# Patient Record
(demographics unavailable — no encounter records)

---

## 2025-07-01 NOTE — ACTIVE PROBLEMS
Arterial Line    Performed by: Alonzo Lorenzo MD  Authorized by: Alonzo Lorenzo MD    Patient Location:  OR  Indication*:  Continuous blood pressure monitoring  Laterality*:  Left  Site*:  Radial  Max Sterile Barrier Technique*:  Sterile gloves, Sterile dressing applied and Cap/Mask  Local Anesthetic:  None  Prep*:  Chlorhexidine gluconate (CHG)  Catheter Size*:  20 G  Catheter Type:  Arrow  Ultrasound-Guided*: No    Seldinger Technique: Yes    Line Secured*:  Tape and Transparent dressing  Events: patient tolerated procedure well with no complications         [Hypertension] : no hypertension [Heart Disease] : no heart disease [Autoimmune Disease] : no autoimmune disease [Renal Disease] : no kidney disease, no UTI [Neurologic Disorder] : no neurologic disorder, no epilepsy [Psychiatric Disorders] : no psychiatric disorders [Depression] : no depression, no post partum depression [Hepatic Disorder] : no hepatitis, no liver disease [Thrombophlebitis] : no varicosities, no phlebitis [Thyroid Disorder] : no thyroid dysfunction [Trauma] : no trauma/violence [Blood Transfusion (___ Ml)] : no history of blood transfusion

## 2025-07-01 NOTE — FAMILY HISTORY
[Reported Family History Of Birth Defects] : no congenital heart defects [Jesús-Sachs Carrier] : no Jesús-Sachs [Family History] : no mental retardation/autism [Reported Family History Of Genetic Disease] : no history of child defect in child of baby father

## 2025-07-01 NOTE — OB HISTORY
[Definite:  ___ (Date)] : the last menstrual period was [unfilled] [Normal Amount/Duration] : was of a normal amount and duration [Regular Cycle Intervals] : periods have been regular [Pregnancy History] : girl [LMP: ___] : LMP: [unfilled] [CORRIE: ___] : CORRIE: [unfilled] [EGA: ___ wks] : EGA: [unfilled] wks [Spontaneous] : Spontaneous conception [Spotting Between  Menses] : no spotting between menses [___] : no pregnancy complications reported [FreeTextEntry1] : First prenatal visit was on 1/3/2025.

## 2025-07-01 NOTE — DISCUSSION/SUMMARY
[FreeTextEntry1] : She is 34 weeks and 6 days gestation by her last menstrual period dates.  She is 37 years old.  Regarding her advanced maternal age, she did not have genetic counseling.  She did not have prenatal diagnostic testing.  She had a noninvasive prenatal screening test that reported a low risk for fetal chromosomal malformations.  She had a maternal serum alpha-fetoprotein level that reported a low risk for fetal open neural tube defects.  The second trimester fetal anatomy ultrasound examination reported normal fetal anatomy.  She is allergic to aspirin and is not taking daily baby aspirin to reduce the risk of having preeclampsia.  Regarding her gestational diabetes, she had a telehealth visit with the diabetes educator on 2025 for initial diabetes education and counseling.  She had a follow-up telehealth visit with the diabetes educator on 2025.  She states that she has been following the recommended diabetic diet. She performs fasting and 1-hour postprandial self- glucose monitoring from the beginning of the meal. My review of her food / glucose logbook from 2025 to 2025 revealed normal glucose values. She appears to have good glycemic control. I informed her that maintaining euglycemia is the most important factor associated with good  outcomes in pregnancies complicated by gestational diabetes. I told her that poor glucose control may cause fetal macrosomia, shoulder dystocia,  delivery, stillbirth,  respiratory distress syndrome and  metabolic complications such as hypoglycemia and hyperbilirubinemia.  I told her that she is at risk for developing gestational hypertension or preeclampsia during the current pregnancy. I also told her that she is at risk for developing type 2 diabetes, metabolic syndrome and cardiovascular disease later in life.  I recommended having a 75-gram 2-hour OGTT approximately 6 - 12 weeks postpartum to determine whether she has impaired glucose tolerance not diagnosed prior to the pregnancy.  I gave her dietary counseling.  I encouraged her to eat three daily meals with 3 snacks to reduce postprandial glucose fluctuations.  I encouraged her to bring the food / glucose diary to her prenatal visits.  She has a follow-up telehealth visit with the diabetes educator scheduled for 2025.  I ordered a hemoglobin A1c level.  I recommended having an ultrasound examination for fetal growth and a follow-up [facility] visit in 3 - 4 weeks.  Weekly fetal testing starting in 2 weeks was scheduled.  All questions were answered.

## 2025-07-01 NOTE — VITALS
[LMP (date): ___] : LMP was on [unfilled] [GA =___ Weeks] : which calculates to a GA of [unfilled] weeks [GA= ___ Days] : and [unfilled] day(s) [CORRIE by LMP (date): ___] : The calculated CORRIE by LMP is [unfilled] [By LMP] : this is the final CORRIE